# Patient Record
Sex: FEMALE
[De-identification: names, ages, dates, MRNs, and addresses within clinical notes are randomized per-mention and may not be internally consistent; named-entity substitution may affect disease eponyms.]

---
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
## 2023-08-14 ENCOUNTER — APPOINTMENT (OUTPATIENT)
Dept: OTOLARYNGOLOGY | Facility: CLINIC | Age: 23
End: 2023-08-14
Payer: COMMERCIAL

## 2023-08-14 VITALS
WEIGHT: 123 LBS | SYSTOLIC BLOOD PRESSURE: 97 MMHG | BODY MASS INDEX: 21.79 KG/M2 | HEIGHT: 63 IN | DIASTOLIC BLOOD PRESSURE: 65 MMHG | HEART RATE: 76 BPM

## 2023-08-14 DIAGNOSIS — G50.1 ATYPICAL FACIAL PAIN: ICD-10-CM

## 2023-08-14 DIAGNOSIS — J34.2 DEVIATED NASAL SEPTUM: ICD-10-CM

## 2023-08-14 DIAGNOSIS — Z91.09 OTHER ALLERGY STATUS, OTHER THAN TO DRUGS AND BIOLOGICAL SUBSTANCES: ICD-10-CM

## 2023-08-14 DIAGNOSIS — J32.2 CHRONIC ETHMOIDAL SINUSITIS: ICD-10-CM

## 2023-08-14 DIAGNOSIS — J34.3 HYPERTROPHY OF NASAL TURBINATES: ICD-10-CM

## 2023-08-14 PROCEDURE — 31231 NASAL ENDOSCOPY DX: CPT

## 2023-08-14 PROCEDURE — 99204 OFFICE O/P NEW MOD 45 MIN: CPT | Mod: 25

## 2023-08-14 RX ORDER — ONDANSETRON 4 MG/1
4 TABLET ORAL
Qty: 9 | Refills: 0 | Status: ACTIVE | COMMUNITY
Start: 2023-03-27

## 2023-08-14 RX ORDER — CLINDAMYCIN PHOSPHATE 10 MG/ML
1 SOLUTION TOPICAL
Qty: 60 | Refills: 0 | Status: ACTIVE | COMMUNITY
Start: 2023-07-18

## 2023-08-14 RX ORDER — AZELASTINE HYDROCHLORIDE 137 UG/1
0.1 SPRAY, METERED NASAL TWICE DAILY
Qty: 1 | Refills: 5 | Status: ACTIVE | COMMUNITY
Start: 2023-08-14 | End: 1900-01-01

## 2023-08-14 NOTE — REASON FOR VISIT
[Initial Consultation] : an initial consultation for [Nasal Septum (Deviated)] : deviated nasal septum [FreeTextEntry2] : sinuses

## 2023-08-14 NOTE — REVIEW OF SYSTEMS
[Seasonal Allergies] : seasonal allergies [Nasal Congestion] : nasal congestion [Sinus Pain] : sinus pain [Sinus Pressure] : sinus pressure [Discolored Nasal Discharge] : discolored nasal discharge [Negative] : Heme/Lymph [Patient Intake Form Reviewed] : Patient intake form was reviewed

## 2023-08-14 NOTE — ASSESSMENT
[FreeTextEntry1] : Exam reveals significant nasal obstruction related nasal septal deviation, hypertropic inferior nasal turbinates and allergic or nonspecific rhinitis. septum 2-3 plus trial azelastine spray ct sinuses

## 2023-08-14 NOTE — PROCEDURE
[FreeTextEntry6] : Indication for procedure:  Unable to adequately examine mid and posterior nasal cavity with anterior rhinoscopy The patient has chronic nasa congestion  Sinus endoscope # 86 Nasal septum exam shows septal deviation to the rt 2-3 plus The inferior nasal turbinates are moderately hypertrophic in size bilaterally. The sinus endoscope was introduced into the right nares exam right middle meatus reveals no mucopus or inflammation.  The right middle turbinate is WNL. The scope was advanced and the sphenoethmoid region was inspected. The superior meatus, superior turbinate and nasal vault are unremarkable.  The nasopharynx is unremarkable without inflammation or mass. The sinus endoscope was introduced into the left nares and exam left middle meatus reveals no mucopus or inflammation.  The left middle turbinate is WNL. The scope was advanced and the sphenoethmoid region was inspected. The left superior meatus and nasal vault are unremarkable.

## 2023-08-14 NOTE — HISTORY OF PRESENT ILLNESS
[de-identified] : long hx sinus problems sinusutis q 2-3 mo past 2 years associated nasal obstruction, pnd-green facial pain now w alternating nasal obstruction, using flonase and benedryl w some  benefit seasonal allergies-had allergy testing- pos tree pollen and dust neg environmental irritants

## 2023-08-25 ENCOUNTER — OUTPATIENT (OUTPATIENT)
Dept: OUTPATIENT SERVICES | Facility: HOSPITAL | Age: 23
LOS: 1 days | End: 2023-08-25
Payer: COMMERCIAL

## 2023-08-25 ENCOUNTER — APPOINTMENT (OUTPATIENT)
Dept: CT IMAGING | Facility: CLINIC | Age: 23
End: 2023-08-25
Payer: COMMERCIAL

## 2023-08-25 DIAGNOSIS — J34.3 HYPERTROPHY OF NASAL TURBINATES: ICD-10-CM

## 2023-08-25 PROCEDURE — 70486 CT MAXILLOFACIAL W/O DYE: CPT | Mod: 26

## 2023-08-25 PROCEDURE — 70486 CT MAXILLOFACIAL W/O DYE: CPT
